# Patient Record
Sex: FEMALE
[De-identification: names, ages, dates, MRNs, and addresses within clinical notes are randomized per-mention and may not be internally consistent; named-entity substitution may affect disease eponyms.]

---

## 2020-11-25 ENCOUNTER — TRANSCRIPTION ENCOUNTER (OUTPATIENT)
Age: 30
End: 2020-11-25

## 2020-12-30 ENCOUNTER — TRANSCRIPTION ENCOUNTER (OUTPATIENT)
Age: 30
End: 2020-12-30

## 2021-01-28 ENCOUNTER — TRANSCRIPTION ENCOUNTER (OUTPATIENT)
Age: 31
End: 2021-01-28

## 2021-02-25 ENCOUNTER — TRANSCRIPTION ENCOUNTER (OUTPATIENT)
Age: 31
End: 2021-02-25

## 2022-01-28 ENCOUNTER — TRANSCRIPTION ENCOUNTER (OUTPATIENT)
Age: 32
End: 2022-01-28

## 2022-02-06 ENCOUNTER — TRANSCRIPTION ENCOUNTER (OUTPATIENT)
Age: 32
End: 2022-02-06

## 2024-04-08 ENCOUNTER — NON-APPOINTMENT (OUTPATIENT)
Age: 34
End: 2024-04-08

## 2024-04-09 PROBLEM — Z00.00 ENCOUNTER FOR PREVENTIVE HEALTH EXAMINATION: Status: ACTIVE | Noted: 2024-04-09

## 2024-05-13 ENCOUNTER — NON-APPOINTMENT (OUTPATIENT)
Age: 34
End: 2024-05-13

## 2024-05-17 ENCOUNTER — NON-APPOINTMENT (OUTPATIENT)
Age: 34
End: 2024-05-17

## 2024-05-20 ENCOUNTER — NON-APPOINTMENT (OUTPATIENT)
Age: 34
End: 2024-05-20

## 2024-05-20 ENCOUNTER — APPOINTMENT (OUTPATIENT)
Dept: HEMATOLOGY ONCOLOGY | Facility: CLINIC | Age: 34
End: 2024-05-20

## 2024-05-20 NOTE — DISCUSSION/SUMMARY
[FreeTextEntry1] : REASON FOR CONSULT Radhika Parada (Preferred name is Mercedes) is a 34-year-old female who was self-referred for cancer genetic counseling and risk assessment due to a family history of cancer.  RELEVANT MEDICAL HISTORY Ms. Jose Parada is a healthy individual who has never had cancer. She has a family history of cancer, see below.  OTHER MEDICAL AND SURGICAL HISTORY: -	Medical History: No major medical history reported. -	Surgical History: left wrist surgery  PAST OB/GYN HISTORY: Obstetrical History:  Age at Menarche: 12 Premenopausal  Age at First Live Birth: N/A Oral Contraceptive Use: Yes, approximately 14/15 years in total Other Contraceptive Use: IUD, approximately 3 years in total Hormone Replacement Therapy: No Of note, Ms. Jose Parada reports undergoing one cycle of egg freezing.   CANCER SCREENING HISTORY:   Breast:  -	Mammography: No -	Sonography: last 2022, reportedly normal  -	MRI: No -	Biopsies: No GYN: -	Pelvic Examination: last 2024, reportedly normal. Patient reports that she had a HPV+ PAP smear in 2023 but she reports that she underwent additional testing in 2024 which was reportedly normal.  -	Sonography: No -	CA-125: No Colon: -	Colonoscopy: No -	Upper Endoscopy: last 2 weeks ago for prevention, patient reported that gastritis was identified. Skin:   -	FBSE: No -	Lesions biopsied/removed: No  SOCIAL HISTORY: -	Tobacco-product use: No  FAMILY HISTORY: Maternal ancestry was reported as Citizen of [place] and Citizen of [place] and paternal ancestry was reported as Citizen of [place]. Ashkenazi Buddhist ancestry and consanguinity were denied. A detailed family history of cancer was ascertained. Relevant diagnoses are detailed below and in the scanned pedigree.   To Ms. Jose Parada's knowledge, no one in the family has had germline testing for cancer susceptibility.    	RISK ASSESSMENT: Ms. Jose Parada's family history of breast cancer and primary peritoneal cancer is suggestive of an inherited predisposition to breast cancer and/or peritoneal/ovarian cancers and related cancers.  We recommended genetic testing for genes associated with breast and gynecological cancer. This test analyzes 19 genes: JONATHAN, BARD1, BRCA1, BRCA2, BRIP1, CDH1, CHEK2, EPCAM, MLH1, MSH2, MSH6, NF1, PALB2, PMS2, PTEN, RAD51C, RAD51D, STK11, and TP53.  We discussed the risks, benefits and limitations, and implications of genetic testing. We also discussed the psychosocial implications of genetic testing. Possible test results were reviewed with Ms. Jose Parada, along with associated medical management options. The Genetic Information Non-discrimination Act (ODILIA) was also reviewed.   Ms. Jose Parada consented to the above-mentioned genetic testing panel. Blood was drawn in our laboratory and sent to [facility] today.  PLAN:  1.	Blood drawn today will be sent to [facility] for analysis.  2.	We will contact Ms. Jose Parada once the results are available and will schedule a follow-up appointment, as needed. Results generally return in 2-3 weeks from the day the sample is received in the lab.  For any additional questions please call [facility] at [phone].    Nay Asher MS, [facility] [place] [facility]or, [facility]   CC:  Patient

## 2024-05-21 ENCOUNTER — APPOINTMENT (OUTPATIENT)
Dept: BREAST CENTER | Facility: CLINIC | Age: 34
End: 2024-05-21
Payer: COMMERCIAL

## 2024-05-21 VITALS
HEART RATE: 72 BPM | BODY MASS INDEX: 23.54 KG/M2 | DIASTOLIC BLOOD PRESSURE: 62 MMHG | HEIGHT: 67 IN | WEIGHT: 150 LBS | SYSTOLIC BLOOD PRESSURE: 94 MMHG

## 2024-05-21 DIAGNOSIS — Z80.0 FAMILY HISTORY OF MALIGNANT NEOPLASM OF DIGESTIVE ORGANS: ICD-10-CM

## 2024-05-21 DIAGNOSIS — Z91.89 OTHER SPECIFIED PERSONAL RISK FACTORS, NOT ELSEWHERE CLASSIFIED: ICD-10-CM

## 2024-05-21 DIAGNOSIS — Z80.8 FAMILY HISTORY OF MALIGNANT NEOPLASM OF OTHER ORGANS OR SYSTEMS: ICD-10-CM

## 2024-05-21 DIAGNOSIS — Z80.3 FAMILY HISTORY OF MALIGNANT NEOPLASM OF BREAST: ICD-10-CM

## 2024-05-21 DIAGNOSIS — F12.90 CANNABIS USE, UNSPECIFIED, UNCOMPLICATED: ICD-10-CM

## 2024-05-21 PROCEDURE — 99204 OFFICE O/P NEW MOD 45 MIN: CPT

## 2024-05-21 RX ORDER — SERTRALINE HYDROCHLORIDE 50 MG/1
50 TABLET, FILM COATED ORAL
Refills: 0 | Status: ACTIVE | COMMUNITY

## 2024-05-21 RX ORDER — BUSPIRONE HYDROCHLORIDE 10 MG/1
10 TABLET ORAL
Refills: 0 | Status: ACTIVE | COMMUNITY

## 2024-05-21 NOTE — HISTORY OF PRESENT ILLNESS
[FreeTextEntry1] : 35 yo female, self referred, presents for high risk screening. Pt denies palpable masses, skin lesions/changes, nipple discharge, or other breast complaints.  TORIN lifetime risk is 30.4% the patients mother was diagnosed with breast cancer in her 50's the patient completed genetic counseling/testing on 5/20/24; the results are still pending

## 2024-05-21 NOTE — DATA REVIEWED
[FreeTextEntry1] : 5/30/22 ([facility]) B/l ultrasound: no evidence of malignancy. BI-RADS 1.

## 2024-05-21 NOTE — PLAN
[TextEntry] : Mammo & US in the next few weeks F/u on the results of the genetic testing If benign, RTC in 6 months

## 2024-05-21 NOTE — PHYSICAL EXAM
[Normocephalic] : normocephalic [No Supraclavicular Adenopathy] : no supraclavicular adenopathy [Examined in the supine and seated position] : examined in the supine and seated position [No dominant masses] : no dominant masses in right breast  [No dominant masses] : no dominant masses left breast [No Nipple Retraction] : no left nipple retraction [No Nipple Discharge] : no left nipple discharge [No Axillary Lymphadenopathy] : no left axillary lymphadenopathy [No Edema] : no edema [No Rashes] : no rashes [No Ulceration] : no ulceration [Breast Nipple Inversion] : nipples not inverted [Breast Nipple Retraction] : nipples not retracted [Breast Nipple Flattening] : nipples not flattened [Breast Nipple Fissures] : nipples not fissured [de-identified] : fibrocystic throughout

## 2024-05-21 NOTE — ASSESSMENT
[FreeTextEntry1] : 34-year-old female presents for high risk breast cancer screening.  She is overall doing well without complaint.  Discussed with the patient the implications of their lifetime risk, which is considered to be elevated for the development of breast cancer over the span of their lifetime. It was explained that it is recommended that they undergo high risk screening surveillance to include biannual radiological screening exams with a mammogram and screening MRI.  Will start with a baseline mammogram and ultrasound in the next few weeks; if that is benign, and genetic testing is negative, can hold off on MRIs until age 39.  Reviewed the standard lifestyle modifications for risk reduction including to limit alcohol intake, follow a low-fat diet, and maintaining an active lifestyle.  Discussed the importance of breast self awareness. Encouraged the patient to become familiar with her tissue so as to be aware of any changes from her baseline.  All questions were answered; the patient verbalized understanding and is in agreement with the plan.

## 2024-06-09 ENCOUNTER — NON-APPOINTMENT (OUTPATIENT)
Age: 34
End: 2024-06-09

## 2024-06-11 NOTE — DISCUSSION/SUMMARY
[FreeTextEntry1] : RESULTS TRANSMISSION Mercedes Parada is a 34-year-old female who was called on 06/10/2024 for a discussion regarding their genetic testing results related to hereditary cancer predisposition.   Ms. Jose Parada was originally seen at [facility] on 05/20/2024 for hereditary cancer predisposition risk assessment due to a family history of cancer. Ms. Jose Parada decided to pursue genetic testing using the BRCANext panel offered at [facility].  TEST RESULTS: NEGATIVE  No pathogenic (disease-causing) variants or VUSs were detected in the following genes:  JONATHAN, BARD1, BRCA1, BRCA2, BRIP1, CDH1, CHEK2, EPCAM, MLH1, MSH2, MSH6, NF1, PALB2, PMS2, PTEN, RAD51C, RAD51D, STK11, and TP53.  RESULTS INTERPRETATION AND ASSESSMENT: Given Ms. Jose Parada's personal medical history and current reported family history of cancer, and her negative genetic test results, the following screening guidelines and risk-reducing recommendations were discussed:  BREAST:  - An TORIN risk evaluation, v8 was conducted utilizing family history and reproductive factors.  An updated 1.9-2.3% 10-year risk and 20.7-25% remaining lifetime risk of breast cancer was quoted compared to the general population risk of 1% and 11.1%, respectively. As per National Comprehensive Cancer Network (NCCN) guidelines, women with a remaining lifetime breast cancer risk >20% as calculated by models such as TORIN may consider increased-risk breast screening. This includes annual mammograms and annual breast MRIs starting at age 40. This recommendation is concordant with recommendations from her breast care team. It would be reasonable to reassess breast cancer screening recommendations for Ms. Jose Parada at 39 years old.   OTHER:  - In the absence of other indications, Ms. Jose Parada should practice age-appropriate cancer screening of other organ systems as recommended for the general population.  We also discussed that, while no cause of the patient's personal and family history of cancer was identified, this result, while reassuring, does entirely not rule out a hereditary cancer risk in the patient. It is possible, although unlikely, the patient has a mutation in one of the genes tested that is not detectable by this analysis, or has a mutation in a different gene, either known or unknown. It is also possible there is a hereditary cancer predisposition in the family, but the patient did not inherit it.  We informed Ms. Jose Parada that our knowledge of genetics and inherited cancer conditions is changing rapidly. Therefore, we recommended that Ms. Jose Parada contact our office, every 2 to 3 years, to discuss relevant advances in cancer genetics.  We emphasized the importance of re-contacting us with updates regarding her personal and family history of cancer as well as any updates regarding additional cancer genetic test results performed for the patient and/or family members.  Such updates could possibly change our risk assessment and recommendations.   In addition, we discussed Ms. Jose Parada's mother should consider pursuing cancer risk assessment genetic counseling with the option of genetic testing. Ms. Jose Parada was encouraged to share her mother's genetic testing results, if she pursues testing, with [facility] for review.   PLAN: 1.See above for recommended screening and risk-reduction strategies. 2. Patient informed consult note(s) will be available through their [facility] patient portal and genetic test results will be released via [facility]'s laboratory portal.  3. Ms. Jose Parada was encouraged to contact us every 2-3 years to discuss relevant advances in cancer genetics, or sooner if there are any changes in her personal or family history of cancer.   For any additional questions please call Cancer [facility] at [phone].    Nay Asher MS, [facility] [place] [facility], [facility]   CC:  Patient

## 2024-11-19 ENCOUNTER — NON-APPOINTMENT (OUTPATIENT)
Age: 34
End: 2024-11-19

## 2024-12-24 ENCOUNTER — NON-APPOINTMENT (OUTPATIENT)
Age: 34
End: 2024-12-24

## 2025-01-10 ENCOUNTER — APPOINTMENT (OUTPATIENT)
Dept: BREAST CENTER | Facility: CLINIC | Age: 35
End: 2025-01-10

## 2025-06-09 ENCOUNTER — NON-APPOINTMENT (OUTPATIENT)
Age: 35
End: 2025-06-09